# Patient Record
Sex: FEMALE | Race: BLACK OR AFRICAN AMERICAN | NOT HISPANIC OR LATINO | URBAN - METROPOLITAN AREA
[De-identification: names, ages, dates, MRNs, and addresses within clinical notes are randomized per-mention and may not be internally consistent; named-entity substitution may affect disease eponyms.]

---

## 2018-05-01 ENCOUNTER — EMERGENCY (EMERGENCY)
Facility: HOSPITAL | Age: 48
LOS: 1 days | Discharge: HOME | End: 2018-05-01
Admitting: EMERGENCY MEDICINE
Payer: SELF-PAY

## 2018-05-01 VITALS
HEART RATE: 95 BPM | SYSTOLIC BLOOD PRESSURE: 135 MMHG | RESPIRATION RATE: 15 BRPM | TEMPERATURE: 99 F | WEIGHT: 169.98 LBS | OXYGEN SATURATION: 97 % | DIASTOLIC BLOOD PRESSURE: 83 MMHG

## 2018-05-01 DIAGNOSIS — G89.29 OTHER CHRONIC PAIN: ICD-10-CM

## 2018-05-01 DIAGNOSIS — M54.5 LOW BACK PAIN: ICD-10-CM

## 2018-05-01 DIAGNOSIS — F17.200 NICOTINE DEPENDENCE, UNSPECIFIED, UNCOMPLICATED: ICD-10-CM

## 2018-05-01 PROCEDURE — 99053 MED SERV 10PM-8AM 24 HR FAC: CPT

## 2018-05-01 PROCEDURE — 99283 EMERGENCY DEPT VISIT LOW MDM: CPT | Mod: 25

## 2018-05-01 RX ORDER — OXYCODONE AND ACETAMINOPHEN 5; 325 MG/1; MG/1
1 TABLET ORAL ONCE
Qty: 0 | Refills: 0 | Status: DISCONTINUED | OUTPATIENT
Start: 2018-05-01 | End: 2018-05-01

## 2018-05-01 RX ADMIN — OXYCODONE AND ACETAMINOPHEN 1 TABLET(S): 5; 325 TABLET ORAL at 23:03

## 2018-05-01 NOTE — ED ADULT TRIAGE NOTE - CHIEF COMPLAINT QUOTE
Pt with complaint of lower back pain X 2 days that may have been aggravated by yoga today. States pain has been on and off X 7 months since she got a mastectomy.

## 2018-05-01 NOTE — ED PROVIDER NOTE - OBJECTIVE STATEMENT
pt. with h/o breast ca, s/p lt mastectomy, on tamoxifen, c/o her typical lower back pain. Pain localized to lower back , non- radiating, no LE weakness, no bowel/bladder dysfunction.  no Ua sx, no fever/chills.  Pt. requesting percocet.  states scheduled for pain management next wk. had an MI last wk results pending.

## 2018-05-01 NOTE — ED PROVIDER NOTE - CHPI ED SYMPTOMS NEG
no difficulty bearing weight/no tingling/no neck tenderness/no motor function loss/no bowel dysfunction/no wt loss/no fatigue/no anorexia/no bladder dysfunction/no constipation/no numbness

## 2018-05-01 NOTE — ED PROVIDER NOTE - MEDICAL DECISION MAKING DETAILS
pt with chronic back pain , presenting with her typical back pain. vss, exam unremarkable, pt. has out pt. f/u, no indication for imaging, pain meds, re-assess.

## 2018-05-01 NOTE — ED ADULT NURSE NOTE - OBJECTIVE STATEMENT
Pt presents to ED with c/o back pain exacerbated by yoga today. No loss of bowel or bladder, denies paresthesias, ambulatory to ED.

## 2018-05-01 NOTE — ED ADULT NURSE NOTE - CHPI ED SYMPTOMS NEG
no tingling/no neck tenderness/no bladder dysfunction/no constipation/no difficulty bearing weight/no fatigue/no numbness/no motor function loss/no bowel dysfunction/no anorexia